# Patient Record
Sex: MALE | Race: WHITE | Employment: FULL TIME | URBAN - METROPOLITAN AREA
[De-identification: names, ages, dates, MRNs, and addresses within clinical notes are randomized per-mention and may not be internally consistent; named-entity substitution may affect disease eponyms.]

---

## 2024-02-27 ENCOUNTER — INITIAL CONSULT (OUTPATIENT)
Age: 54
End: 2024-02-27
Payer: COMMERCIAL

## 2024-02-27 VITALS
BODY MASS INDEX: 29.26 KG/M2 | WEIGHT: 216 LBS | SYSTOLIC BLOOD PRESSURE: 124 MMHG | HEART RATE: 60 BPM | HEIGHT: 72 IN | DIASTOLIC BLOOD PRESSURE: 82 MMHG

## 2024-02-27 DIAGNOSIS — Z76.89 ENCOUNTER TO ESTABLISH CARE: ICD-10-CM

## 2024-02-27 DIAGNOSIS — R93.1 AGATSTON CAC SCORE, <100: Primary | ICD-10-CM

## 2024-02-27 PROCEDURE — 99243 OFF/OP CNSLTJ NEW/EST LOW 30: CPT | Performed by: INTERNAL MEDICINE

## 2024-02-27 PROCEDURE — 93000 ELECTROCARDIOGRAM COMPLETE: CPT | Performed by: INTERNAL MEDICINE

## 2024-02-27 ASSESSMENT — ENCOUNTER SYMPTOMS
WHEEZING: 0
STRIDOR: 0
ABDOMINAL PAIN: 0
HEMATEMESIS: 0
HEMATOCHEZIA: 0
DOUBLE VISION: 0
HEMOPTYSIS: 0
EYE REDNESS: 0
HOARSE VOICE: 0

## 2024-02-27 NOTE — PROGRESS NOTES
time.  -If we have to consider statin therapy, even low-dose rosuvastatin 5 to 10 mg daily versus 10 mg 3 days a week and be considered which should be able to get his LDL less than 70.     On this date I have spent 45 minutes personally reviewing previous notes/outside records, test results and face to face time with the patient discussing the diagnosis and importance of compliance with the treatment plan as well as documenting on the day of the visit.       Elements of this note have been dictated using speech recognition software. As a result, errors of speech recognition may have occurred.    Return in about 1 year (around 2/27/2025).     Thank you Dr Leung for allowing us to participate in the care of your patient.  If you have any further questions, please do not hesitate to contact us.  Sincerely,        Irwin Barajas MD   2/27/2024